# Patient Record
Sex: FEMALE | Race: AMERICAN INDIAN OR ALASKA NATIVE
[De-identification: names, ages, dates, MRNs, and addresses within clinical notes are randomized per-mention and may not be internally consistent; named-entity substitution may affect disease eponyms.]

---

## 2022-06-07 ENCOUNTER — HOSPITAL ENCOUNTER (EMERGENCY)
Dept: HOSPITAL 5 - ED | Age: 57
End: 2022-06-07
Payer: SELF-PAY

## 2022-06-07 DIAGNOSIS — I46.9: Primary | ICD-10-CM

## 2022-06-07 PROCEDURE — 92950 HEART/LUNG RESUSCITATION CPR: CPT

## 2022-06-07 PROCEDURE — 99285 EMERGENCY DEPT VISIT HI MDM: CPT
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
